# Patient Record
Sex: FEMALE | Race: BLACK OR AFRICAN AMERICAN
[De-identification: names, ages, dates, MRNs, and addresses within clinical notes are randomized per-mention and may not be internally consistent; named-entity substitution may affect disease eponyms.]

---

## 2023-12-27 ENCOUNTER — TELEPHONE (OUTPATIENT)
Facility: CLINIC | Age: 58
End: 2023-12-27

## 2023-12-27 NOTE — TELEPHONE ENCOUNTER
Patient called and medication is not working for pain,  She is requesting something else as the pain is really bad.  Patient advised Dr. Vicente not in until Jan 2nd but message would be put in as high priority

## 2023-12-28 DIAGNOSIS — M19.90 ARTHRITIS: Primary | ICD-10-CM

## 2023-12-28 RX ORDER — TRAMADOL HYDROCHLORIDE 50 MG/1
50 TABLET ORAL EVERY 4 HOURS PRN
Qty: 42 TABLET | Refills: 0 | Status: SHIPPED | OUTPATIENT
Start: 2023-12-28 | End: 2024-01-04

## 2024-01-05 NOTE — PROGRESS NOTES
[facility] - [facility], [place] ([place])        PLAN:  Patient's knees injected 4 cc 0.25% Marcaineand 0.5 cc Celestone. She will start a brief course of outpatient physical therapy. Consider viscosupplementation if pain continues.  Follow up PRN. Bariatric referral.     Documentation by eusebia Toure, as documented by Jaime Ricks MD.

## 2024-01-12 ENCOUNTER — OFFICE VISIT (OUTPATIENT)
Age: 59
End: 2024-01-12

## 2024-01-12 VITALS — TEMPERATURE: 98.5 F | BODY MASS INDEX: 38.28 KG/M2 | HEIGHT: 62 IN | WEIGHT: 208 LBS

## 2024-01-12 DIAGNOSIS — M25.562 LEFT KNEE PAIN, UNSPECIFIED CHRONICITY: Primary | ICD-10-CM

## 2024-01-12 DIAGNOSIS — M17.11 UNILATERAL PRIMARY OSTEOARTHRITIS, RIGHT KNEE: ICD-10-CM

## 2024-01-12 DIAGNOSIS — M17.12 UNILATERAL PRIMARY OSTEOARTHRITIS, LEFT KNEE: ICD-10-CM

## 2024-01-12 DIAGNOSIS — M21.162 GENU VARUM OF BOTH LOWER EXTREMITIES: ICD-10-CM

## 2024-01-12 DIAGNOSIS — M21.161 GENU VARUM OF BOTH LOWER EXTREMITIES: ICD-10-CM

## 2024-01-12 DIAGNOSIS — M25.561 RIGHT KNEE PAIN, UNSPECIFIED CHRONICITY: ICD-10-CM

## 2024-01-12 RX ORDER — BUPIVACAINE HYDROCHLORIDE 5 MG/ML
4 INJECTION, SOLUTION PERINEURAL ONCE
Status: COMPLETED | OUTPATIENT
Start: 2024-01-12 | End: 2024-01-12

## 2024-01-12 RX ORDER — BETAMETHASONE SODIUM PHOSPHATE AND BETAMETHASONE ACETATE 3; 3 MG/ML; MG/ML
3 INJECTION, SUSPENSION INTRA-ARTICULAR; INTRALESIONAL; INTRAMUSCULAR; SOFT TISSUE ONCE
Status: COMPLETED | OUTPATIENT
Start: 2024-01-12 | End: 2024-01-12

## 2024-01-12 RX ADMIN — BUPIVACAINE HYDROCHLORIDE 20 MG: 5 INJECTION, SOLUTION PERINEURAL at 15:03

## 2024-01-12 RX ADMIN — BETAMETHASONE SODIUM PHOSPHATE AND BETAMETHASONE ACETATE 3 MG: 3; 3 INJECTION, SUSPENSION INTRA-ARTICULAR; INTRALESIONAL; INTRAMUSCULAR; SOFT TISSUE at 15:01

## 2024-01-12 RX ADMIN — BUPIVACAINE HYDROCHLORIDE 20 MG: 5 INJECTION, SOLUTION PERINEURAL at 15:02

## 2024-01-12 RX ADMIN — BETAMETHASONE SODIUM PHOSPHATE AND BETAMETHASONE ACETATE 3 MG: 3; 3 INJECTION, SUSPENSION INTRA-ARTICULAR; INTRALESIONAL; INTRAMUSCULAR; SOFT TISSUE at 15:00

## 2024-01-16 ENCOUNTER — TELEPHONE (OUTPATIENT)
Age: 59
End: 2024-01-16

## 2024-01-16 NOTE — TELEPHONE ENCOUNTER
Patient states after her injection she has been having a menstrual cycle and wants to know if that is a side effect because she has not had a menstrual cycle in years.     Patient also stated the injection is not helping her pain and her right upper thigh is hurting up to groin area.    Patient tel: [phone]

## 2024-01-17 NOTE — TELEPHONE ENCOUNTER
/17/23 Patient was called left a message for the patient to call our office to discuss her concerns.

## 2024-01-17 NOTE — TELEPHONE ENCOUNTER
1/17/24 Patient was called today. She has an appt. On Friday 1/19 with her PCP. She will discuss her concerns to make sure it isn't anything else going on that could be making her bleed.

## 2024-01-19 ENCOUNTER — TELEPHONE (OUTPATIENT)
Facility: CLINIC | Age: 59
End: 2024-01-19

## 2024-01-19 NOTE — TELEPHONE ENCOUNTER
----- Message from Oumou Berry sent at 1/19/2024 11:00 AM EST -----  Subject: Message to Provider    QUESTIONS  Information for Provider? Patient is asking for medication for a cold and   pain for her ortho, it did help her knee's(cortisone shots) but upper   thighs are in pain can barely walk. Also said had a menstrual cycle   afterward (shot) could that be a side effect.  ---------------------------------------------------------------------------  --------------  CALL BACK INFO  [phone]; OK to leave message on voicemail  ---------------------------------------------------------------------------  --------------  SCRIPT ANSWERS  Relationship to Patient? Self

## 2024-01-23 DIAGNOSIS — M19.90 ARTHRITIS: ICD-10-CM

## 2024-01-23 RX ORDER — IBUPROFEN 600 MG/1
600 TABLET ORAL 3 TIMES DAILY PRN
Qty: 90 TABLET | Refills: 0 | Status: SHIPPED | OUTPATIENT
Start: 2024-01-23

## 2024-02-01 ENCOUNTER — TELEPHONE (OUTPATIENT)
Facility: HOSPITAL | Age: 59
End: 2024-02-01

## 2024-02-01 NOTE — TELEPHONE ENCOUNTER
Called and spoke to pt because she no showed for her IE. Her ride didn't show up which is why she didn't come in. She was transferred to the  to reschedule.

## 2024-02-06 ENCOUNTER — TELEMEDICINE (OUTPATIENT)
Facility: CLINIC | Age: 59
End: 2024-02-06
Payer: COMMERCIAL

## 2024-02-06 DIAGNOSIS — M25.551 PAIN OF RIGHT HIP: Primary | ICD-10-CM

## 2024-02-06 DIAGNOSIS — R05.9 COUGH IN ADULT: ICD-10-CM

## 2024-02-06 PROCEDURE — 99213 OFFICE O/P EST LOW 20 MIN: CPT | Performed by: FAMILY MEDICINE

## 2024-02-06 PROCEDURE — G8484 FLU IMMUNIZE NO ADMIN: HCPCS | Performed by: FAMILY MEDICINE

## 2024-02-06 PROCEDURE — 3017F COLORECTAL CA SCREEN DOC REV: CPT | Performed by: FAMILY MEDICINE

## 2024-02-06 PROCEDURE — G8417 CALC BMI ABV UP PARAM F/U: HCPCS | Performed by: FAMILY MEDICINE

## 2024-02-06 PROCEDURE — 4004F PT TOBACCO SCREEN RCVD TLK: CPT | Performed by: FAMILY MEDICINE

## 2024-02-06 PROCEDURE — G8428 CUR MEDS NOT DOCUMENT: HCPCS | Performed by: FAMILY MEDICINE

## 2024-02-06 RX ORDER — HYDROCODONE BITARTRATE AND ACETAMINOPHEN 5; 325 MG/1; MG/1
1 TABLET ORAL EVERY 4 HOURS PRN
Qty: 18 TABLET | Refills: 0 | Status: SHIPPED | OUTPATIENT
Start: 2024-02-06 | End: 2024-02-09

## 2024-02-06 RX ORDER — BENZONATATE 200 MG/1
200 CAPSULE ORAL 3 TIMES DAILY PRN
Qty: 21 CAPSULE | Refills: 0 | Status: SHIPPED | OUTPATIENT
Start: 2024-02-06 | End: 2024-02-13

## 2024-02-06 SDOH — ECONOMIC STABILITY: FOOD INSECURITY: WITHIN THE PAST 12 MONTHS, YOU WORRIED THAT YOUR FOOD WOULD RUN OUT BEFORE YOU GOT MONEY TO BUY MORE.: NEVER TRUE

## 2024-02-06 SDOH — ECONOMIC STABILITY: INCOME INSECURITY: HOW HARD IS IT FOR YOU TO PAY FOR THE VERY BASICS LIKE FOOD, HOUSING, MEDICAL CARE, AND HEATING?: NOT HARD AT ALL

## 2024-02-06 SDOH — ECONOMIC STABILITY: FOOD INSECURITY: WITHIN THE PAST 12 MONTHS, THE FOOD YOU BOUGHT JUST DIDN'T LAST AND YOU DIDN'T HAVE MONEY TO GET MORE.: NEVER TRUE

## 2024-02-06 SDOH — ECONOMIC STABILITY: HOUSING INSECURITY
IN THE LAST 12 MONTHS, WAS THERE A TIME WHEN YOU DID NOT HAVE A STEADY PLACE TO SLEEP OR SLEPT IN A SHELTER (INCLUDING NOW)?: NO

## 2024-02-06 ASSESSMENT — PATIENT HEALTH QUESTIONNAIRE - PHQ9
SUM OF ALL RESPONSES TO PHQ QUESTIONS 1-9: 0
1. LITTLE INTEREST OR PLEASURE IN DOING THINGS: 0
SUM OF ALL RESPONSES TO PHQ QUESTIONS 1-9: 0
2. FEELING DOWN, DEPRESSED OR HOPELESS: 0
SUM OF ALL RESPONSES TO PHQ9 QUESTIONS 1 & 2: 0

## 2024-02-06 NOTE — PROGRESS NOTES
\"Have you been to the ER, urgent care clinic since your last visit?  Hospitalized since your last visit?\"    NO    “Have you seen or consulted any other health care providers outside of [facility] since your last visit?”    YES - When: approximately 7  weeks ago.  Where and Why: ortho for knee pain.    “Have you had a colorectal cancer screening such as a colonoscopy/FIT/Cologuard?    NO Cologuard ordered in December     Have you had a mammogram?”   NO Mammogram ordered in December     “Have you had a pap smear?”    NO Pap due nnow

## 2024-02-06 NOTE — PROGRESS NOTES
2024    TELEHEALTH EVALUATION -- Audio/Visual    HPI:    Isabela Clark (:  1965) has requested an audio/video evaluation for the following concern(s):    Patient has been seeing orthopedic for her severe arthritis of the knees.  Has been getting steroid injections the last cortisone shot was on .  She noticed 2 days after cortisol show and shot she did get a period for 5 days.  She is postmenopausal.  She does feel better with that but her pain right now is in her right hip.  She has had this for since she was seen here.  And the pain is worse than she can tolerate now.  Has tried tramadol in the past but only puts her to sleep.    She also currently has a upper respiratory infection would like something for the dry cough.        Review of Systems  Arthralgia  Prior to Visit Medications    Medication Sig Taking? Authorizing Provider   HYDROcodone-acetaminophen (LORCET) 5-325 MG per tablet Take 1 tablet by mouth every 4 hours as needed for Pain for up to 3 days. Intended supply: 3 days. Take lowest dose possible to manage pain Max Daily Amount: 6 tablets Yes Simran Vicente MD   benzonatate (TESSALON) 200 MG capsule Take 1 capsule by mouth 3 times daily as needed for Cough Yes Simran Vicente MD   ibuprofen (ADVIL;MOTRIN) 600 MG tablet Take 1 tablet by mouth 3 times daily as needed for Pain Yes Simran Vicente MD   atenolol (TENORMIN) 50 MG tablet Take 1 tablet by mouth daily Yes Simran Vicente MD       Social History     Tobacco Use    Smoking status: Every Day     Current packs/day: 0.50     Average packs/day: 0.5 packs/day for 20.1 years (10.1 ttl pk-yrs)     Types: Cigarettes     Start date: 2003    Smokeless tobacco: Never   Vaping Use    Vaping Use: Never used   Substance Use Topics    Alcohol use: Yes     Alcohol/week: 6.0 standard drinks of alcohol    Drug use: No            PHYSICAL EXAMINATION:  ASSESSMENT/PLAN:  1. Pain of right hip  X-ray right hip

## 2024-02-21 ENCOUNTER — TELEPHONE (OUTPATIENT)
Facility: CLINIC | Age: 59
End: 2024-02-21

## 2024-02-21 NOTE — TELEPHONE ENCOUNTER
Pt stated she was prescribed hydrocodone-acetaminophen for pain medication, but it is not working. Pt stated her upper and inner thigh on her right leg is hurting real bad and it is getting worse and worse. Pt would like for Dr. MCCORMACK to prescribe an alternative medication. Ms. Clark can e reached at [phone]. Please advise. Thank you!!!

## 2024-02-25 DIAGNOSIS — M19.90 ARTHRITIS: ICD-10-CM

## 2024-02-25 RX ORDER — IBUPROFEN 600 MG/1
600 TABLET ORAL 3 TIMES DAILY PRN
Qty: 90 TABLET | Refills: 0 | Status: SHIPPED | OUTPATIENT
Start: 2024-02-25

## 2024-02-28 ENCOUNTER — TELEPHONE (OUTPATIENT)
Facility: CLINIC | Age: 59
End: 2024-02-28

## 2024-02-28 NOTE — TELEPHONE ENCOUNTER
Ms Clark states she is in a Independent Living program and she needs a Letter to give to her  that she agrees that she is able to be living independently    Please follow up with Ms Clark for any questions or clarifications

## 2024-03-04 NOTE — TELEPHONE ENCOUNTER
Left message letting patient know that letter was ready in mychart or could be picked up in office.

## 2024-04-06 DIAGNOSIS — M19.90 ARTHRITIS: ICD-10-CM

## 2024-04-08 ENCOUNTER — TELEMEDICINE (OUTPATIENT)
Facility: CLINIC | Age: 59
End: 2024-04-08
Payer: COMMERCIAL

## 2024-04-08 DIAGNOSIS — I10 PRIMARY HYPERTENSION: ICD-10-CM

## 2024-04-08 DIAGNOSIS — M19.90 ARTHRITIS: Primary | ICD-10-CM

## 2024-04-08 PROCEDURE — 99214 OFFICE O/P EST MOD 30 MIN: CPT | Performed by: FAMILY MEDICINE

## 2024-04-08 PROCEDURE — 4004F PT TOBACCO SCREEN RCVD TLK: CPT | Performed by: FAMILY MEDICINE

## 2024-04-08 PROCEDURE — 3017F COLORECTAL CA SCREEN DOC REV: CPT | Performed by: FAMILY MEDICINE

## 2024-04-08 PROCEDURE — G8417 CALC BMI ABV UP PARAM F/U: HCPCS | Performed by: FAMILY MEDICINE

## 2024-04-08 PROCEDURE — G8427 DOCREV CUR MEDS BY ELIG CLIN: HCPCS | Performed by: FAMILY MEDICINE

## 2024-04-08 RX ORDER — TRAMADOL HYDROCHLORIDE 50 MG/1
50 TABLET ORAL EVERY 4 HOURS PRN
Qty: 42 TABLET | Refills: 0 | Status: SHIPPED | OUTPATIENT
Start: 2024-04-08 | End: 2024-04-15

## 2024-04-08 RX ORDER — ATENOLOL 50 MG/1
50 TABLET ORAL DAILY
Qty: 90 TABLET | Refills: 1 | Status: SHIPPED | OUTPATIENT
Start: 2024-04-08

## 2024-04-08 SDOH — ECONOMIC STABILITY: FOOD INSECURITY: WITHIN THE PAST 12 MONTHS, THE FOOD YOU BOUGHT JUST DIDN'T LAST AND YOU DIDN'T HAVE MONEY TO GET MORE.: NEVER TRUE

## 2024-04-08 SDOH — ECONOMIC STABILITY: INCOME INSECURITY: HOW HARD IS IT FOR YOU TO PAY FOR THE VERY BASICS LIKE FOOD, HOUSING, MEDICAL CARE, AND HEATING?: NOT HARD AT ALL

## 2024-04-08 ASSESSMENT — PATIENT HEALTH QUESTIONNAIRE - PHQ9
1. LITTLE INTEREST OR PLEASURE IN DOING THINGS: NOT AT ALL
2. FEELING DOWN, DEPRESSED OR HOPELESS: NOT AT ALL
SUM OF ALL RESPONSES TO PHQ QUESTIONS 1-9: 0
SUM OF ALL RESPONSES TO PHQ9 QUESTIONS 1 & 2: 0
SUM OF ALL RESPONSES TO PHQ QUESTIONS 1-9: 0

## 2024-04-08 NOTE — PROGRESS NOTES
\"Have you been to the ER, urgent care clinic since your last visit?  Hospitalized since your last visit?\"    NO    “Have you seen or consulted any other health care providers outside of [facility] since your last visit?”    NO    Have you had a mammogram?”   NO    No breast cancer screening on file      “Have you had a pap smear?”    NO    No cervical cancer screening on file         “Have you had a colorectal cancer screening such as a colonoscopy/FIT/Cologuard?    NO    No colonoscopy on file  No cologuard on file  No FIT/FOBT on file   No flexible sigmoidoscopy on file         Click Here for Release of Records Request

## 2024-04-08 NOTE — PROGRESS NOTES
2024    TELEHEALTH EVALUATION -- Audio/Visual    HPI:    Isabela Clark (:  1965) has requested an audio/video evaluation for the following concern(s):    Patient did not get the x-ray that was ordered of her hip where she is having a lot of pain.  The tramadol does help with the hip pain.  Better than hydrocodone.  Has been seeing orthopedic for her knees and they feel better.      Review of Systems  Hip pain  Prior to Visit Medications    Medication Sig Taking? Authorizing Provider   ibuprofen (ADVIL;MOTRIN) 600 MG tablet Take 1 tablet by mouth 3 times daily as needed for Pain Yes Simran Vicente MD   atenolol (TENORMIN) 50 MG tablet Take 1 tablet by mouth daily Yes Simran Vicente MD       Social History     Tobacco Use    Smoking status: Every Day     Current packs/day: 0.50     Average packs/day: 0.5 packs/day for 20.3 years (10.2 ttl pk-yrs)     Types: Cigarettes     Start date: 2003    Smokeless tobacco: Never   Vaping Use    Vaping Use: Never used   Substance Use Topics    Alcohol use: Yes     Alcohol/week: 6.0 standard drinks of alcohol    Drug use: No            PHYSICAL EXAMINATION:  ASSESSMENT/PLAN:  1. Primary hypertension  Recommend she come in to get x-ray of the hip to evaluate for arthritis.  And refer to orthopedics as needed.  In the meantime continue tramadol as needed for pain.  Follow-up in 2 weeks to reassess pain management.    #2.  Hypertension  Stable and well-controlled with amlodipine.  Work on weight loss and DASH diet.  Refill in 6 months.      Isabela Clark, was evaluated through a synchronous (real-time) audio-video encounter. The patient (or guardian if applicable) is aware that this is a billable service, which includes applicable co-pays. This Virtual Visit was conducted with patient's (and/or legal guardian's) consent. Patient identification was verified, and a caregiver was present when appropriate.   The patient was located at Home:

## 2024-04-09 RX ORDER — TRAMADOL HYDROCHLORIDE 50 MG/1
TABLET ORAL
Qty: 42 TABLET | OUTPATIENT
Start: 2024-04-09

## 2024-04-23 DIAGNOSIS — I10 PRIMARY HYPERTENSION: ICD-10-CM

## 2024-04-25 RX ORDER — ATENOLOL 50 MG/1
50 TABLET ORAL DAILY
Qty: 90 TABLET | Refills: 1 | OUTPATIENT
Start: 2024-04-25

## 2024-04-30 ENCOUNTER — TELEPHONE (OUTPATIENT)
Facility: CLINIC | Age: 59
End: 2024-04-30

## 2024-05-01 DIAGNOSIS — M16.0 BILATERAL HIP JOINT ARTHRITIS: Primary | ICD-10-CM

## 2024-05-02 DIAGNOSIS — M19.90 ARTHRITIS: Primary | ICD-10-CM

## 2024-05-02 NOTE — TELEPHONE ENCOUNTER
Last seen 4/8/2024   Last labs 10/21/2022  Last filled  04/08/2024  Next appointment 5/8/2024     Lab Results   Component Value Date     10/21/2022    K 3.5 10/21/2022     10/21/2022    CO2 29 10/21/2022    BUN 16 10/21/2022    CREATININE 0.77 10/21/2022    GLUCOSE 108 (H) 10/21/2022    CALCIUM 9.8 10/21/2022    BILITOT 0.60 10/21/2022    ALKPHOS 65 10/21/2022    AST 32.0 10/21/2022    ALT 16 10/21/2022    GFRAA >60.0 10/21/2022

## 2024-05-02 NOTE — TELEPHONE ENCOUNTER
----- Message from Paris Timothy sent at 5/2/2024 10:28 AM EDT -----  Subject: Refill Request    QUESTIONS  Name of Medication? traMADol (ULTRAM) 50 MG tablet  Patient-reported dosage and instructions? Take 1 tablet by mouth every 4   hours as needed for Pain for up to 7 days. Intended supply? 7 days. Take   lowest dose possible to manage pain Max Daily Amount? 300 mg  How many days do you have left? 0  Preferred Pharmacy? [id number]  Pharmacy phone number (if available)? [phone]  ---------------------------------------------------------------------------  --------------,  Name of Medication? atenolol (TENORMIN) 50 MG tablet  Patient-reported dosage and instructions? Take 1 tablet by mouth daily  How many days do you have left? 0  Preferred Pharmacy? [id number]  Pharmacy phone number (if available)? [phone]  ---------------------------------------------------------------------------  --------------  CALL BACK INFO  What is the best way for the office to contact you? OK to leave message on   voicemail  Preferred Call Back Phone Number? [phone]  ---------------------------------------------------------------------------  --------------  SCRIPT ANSWERS  Relationship to Patient? Self

## 2024-05-07 RX ORDER — TRAMADOL HYDROCHLORIDE 50 MG/1
50 TABLET ORAL EVERY 4 HOURS PRN
Qty: 42 TABLET | Refills: 0 | Status: SHIPPED | OUTPATIENT
Start: 2024-05-07 | End: 2024-05-14

## 2024-06-24 ENCOUNTER — TELEPHONE (OUTPATIENT)
Facility: CLINIC | Age: 59
End: 2024-06-24

## 2024-09-03 ENCOUNTER — COMMUNITY OUTREACH (OUTPATIENT)
Facility: CLINIC | Age: 59
End: 2024-09-03

## 2025-02-27 ENCOUNTER — COMMUNITY OUTREACH (OUTPATIENT)
Facility: CLINIC | Age: 60
End: 2025-02-27